# Patient Record
(demographics unavailable — no encounter records)

---

## 2024-10-31 NOTE — HISTORY OF PRESENT ILLNESS
[de-identified] : left ear pain, cough, and fever [FreeTextEntry6] :  3 year old boy presents with cough and fever x 1 day. One fever last night reported as [id number]. Also c/o left ear pain and sore throat. Father says his cough almost makes him vomit. No sick contacts.

## 2024-10-31 NOTE — DISCUSSION/SUMMARY
[FreeTextEntry1] :  3 year old boy presents with left ear pain, fever, cough.  Well appearing. Has been afebrile today. Lungs clear. Right OME.  Rapid flu, COVID, and strep negative. TC pending.  Likely viral URI.  Recommend supportive care. Antipyretics. Increase fluid intake, teaspoon of honey before sleep, humidifier in bedroom, elevated head during sleep, steam from shower, and nasal saline.   Follow up PRN, for worsening symptoms, persistent fever of >100.4, or failure to improve.

## 2024-10-31 NOTE — HISTORY OF PRESENT ILLNESS
[de-identified] : left ear pain, cough, and fever [FreeTextEntry6] :  3 year old boy presents with cough and fever x 1 day. One fever last night reported as [id number]. Also c/o left ear pain and sore throat. Father says his cough almost makes him vomit. No sick contacts.

## 2024-10-31 NOTE — PHYSICAL EXAM
[Clear Effusion] : clear effusion [Clear Rhinorrhea] : clear rhinorrhea [Erythematous Oropharynx] : erythematous oropharynx [NL] : moves all extremities x4, warm, well perfused x4 [Enlarged Tonsils] : tonsils not enlarged [Vesicles] : no vesicles [Exudate] : no exudate [Palate petechiae] : palate without petechiae

## 2024-12-16 NOTE — HISTORY OF PRESENT ILLNESS
[de-identified] : diarrhea  [FreeTextEntry6] : diarrhea x 3 days  afebrile 10 BMs on day 1 and day 2 today has had 3 BMs so far loose yellow stool without blood  eating and drinking fluids, voiding at baseline No vomiting or abdominal pain  No one at home with similar symptoms No recent travel

## 2024-12-16 NOTE — DISCUSSION/SUMMARY
[FreeTextEntry1] : acute gastroenteritis  Stable vitals, well hydrated, afebrile  supportive care encouraged return if there is no decrease in stool output after 5 days or if he has fever, abdominal pain or poor PO intake

## 2025-02-17 NOTE — PHYSICAL EXAM
[NL] : warm, clear [FreeTextEntry4] : dried blood in right nare, no active bleeding, no septal hematoma

## 2025-02-17 NOTE — HISTORY OF PRESENT ILLNESS
[de-identified] : yesterday, patient vomited x1, this morning, nosebleed x1 (highest temp recorded 101.2). Mom gave patient "some of sibling's Amoxicillin this morning." [FreeTextEntry6] : one episode of fever last night 100.5 no cough, congestion, ear pulling, sore throat or vomiting  had a nosebleed from right nostril this morning

## 2025-04-29 NOTE — HISTORY OF PRESENT ILLNESS
[de-identified] : fever, cough, itchy watery eyes  [FreeTextEntry6] : symptoms present for 2 days cough, rhinorrhea, itchy water eyes fever of 102

## 2025-04-29 NOTE — DISCUSSION/SUMMARY
[FreeTextEntry1] : Flu panel pending Overall well appearing child with clear lungs, no signs of acute bacterial infection Likely viral URI Supportive care encouraged Return for worsening symptoms or new concerns  Additionally patient has intermittent symptoms of itchy water eyes and rhinorrhea usually after coming from outside, possible seasonal allergies. Can trial zyrtec 2.5 mg daily

## 2025-04-30 NOTE — PHYSICAL EXAM
[EOMI] : grossly EOMI [Conjuctival Injection] : conjunctival injection [Increased Tearing] : increased tearing [Discharge] : no discharge [Eyelid Swelling] : eyelid swelling [Allergic Shiners] : allergic shiners [Bilateral] : (bilateral) [Clear Rhinorrhea] : clear rhinorrhea [NL] : warm, clear

## 2025-04-30 NOTE — DISCUSSION/SUMMARY
[FreeTextEntry1] : He has seasonal allegies with juan allergic conjunctivitis will start eye drops,increase dose of cetrizine needs to stay indoors as much as possible. Avoid exposure to environmental allergens. Wash hands and clothing after being outdoors. Recommend supportive care with oral long-acting antihistamine daily. Use nasal saline 2-3 times daily.

## 2025-04-30 NOTE — HISTORY OF PRESENT ILLNESS
[de-identified] : Swollen red eyes [FreeTextEntry6] : 4 years old is back today because his eyes puffed up in school and school sent him home thinking he has conjunctivitis He has no fever yesterday RVP was sent and it is neg He has no fever dad says he was good at home He wants a note saying he should not go out in [facility] during allerg season He has no discharge from eyes.

## 2025-05-19 NOTE — PHYSICAL EXAM

## 2025-05-19 NOTE — DEVELOPMENTAL MILESTONES
[Normal Development] : Normal Development [Goes to the bathroom and has] : goes to bathroom and has bowel movement by self [Dresses and undresses without] : dresses and undresses without much help [Plays make-believe] : plays make-believe [Uses 4-word sentences] : does not use 4-word sentences [Uses words that are 100%] : does not use words that are 100% intelligible to strangers [Climbs stairs, alternating feet] : climbs stairs, alternating feet without support [Draws a simple cross] : draws a simple cross [Grasps a pencil with thumb and] : grasps a pencil with thumb and fingers instead of fist

## 2025-05-19 NOTE — DISCUSSION/SUMMARY
[School Readiness] : school readiness [Healthy Personal Habits] : healthy personal habits [TV/Media] : tv/media [Child and Family Involvement] : child and family involvement [Safety] : safety [] : The components of the vaccine(s) to be administered today are listed in the plan of care. The disease(s) for which the vaccine(s) are intended to prevent and the risks have been discussed with the caretaker.  The risks are also included in the appropriate vaccination information statements which have been provided to the patient's caregiver.  The caregiver has given consent to vaccinate. [FreeTextEntry1] : History of ASD and PAPVR s/p surgical repair - due for cardiology follow up. History of hydronephrosis - due for urology follow up  History of stroke - due for neurology follow up    - Growing appropriately - Speech delay - receiving ST. Meeting all other milestones.  - No labs needed - Vaccines: proquad and quadracel - Lead screening negative - BP normal - Vision/Hearing normal - Has established dental home - Well visit in 1 year